# Patient Record
Sex: FEMALE | Race: WHITE | NOT HISPANIC OR LATINO | Employment: PART TIME | URBAN - METROPOLITAN AREA
[De-identification: names, ages, dates, MRNs, and addresses within clinical notes are randomized per-mention and may not be internally consistent; named-entity substitution may affect disease eponyms.]

---

## 2023-09-13 LAB
ANION GAP IN SER/PLAS: 13 MMOL/L (ref 10–20)
CALCIUM (MG/DL) IN SER/PLAS: 9.7 MG/DL (ref 8.6–10.6)
CARBON DIOXIDE, TOTAL (MMOL/L) IN SER/PLAS: 26 MMOL/L (ref 21–32)
CHLORIDE (MMOL/L) IN SER/PLAS: 102 MMOL/L (ref 98–107)
CREATININE (MG/DL) IN SER/PLAS: 0.69 MG/DL (ref 0.5–1.05)
GFR FEMALE: >90 ML/MIN/1.73M2
GLUCOSE (MG/DL) IN SER/PLAS: 134 MG/DL (ref 74–99)
POTASSIUM (MMOL/L) IN SER/PLAS: 3.8 MMOL/L (ref 3.5–5.3)
SODIUM (MMOL/L) IN SER/PLAS: 137 MMOL/L (ref 136–145)
UREA NITROGEN (MG/DL) IN SER/PLAS: 20 MG/DL (ref 6–23)

## 2023-10-10 ENCOUNTER — APPOINTMENT (OUTPATIENT)
Dept: RADIOLOGY | Facility: CLINIC | Age: 73
End: 2023-10-10
Payer: MEDICARE

## 2023-11-14 ENCOUNTER — HOSPITAL ENCOUNTER (OUTPATIENT)
Dept: RADIOLOGY | Facility: CLINIC | Age: 73
Discharge: HOME | End: 2023-11-14
Payer: MEDICARE

## 2023-11-14 DIAGNOSIS — K86.2 PANCREATIC CYST (HHS-HCC): ICD-10-CM

## 2023-11-14 DIAGNOSIS — K76.89 OTHER SPECIFIED DISEASES OF LIVER: ICD-10-CM

## 2023-11-14 DIAGNOSIS — Q45.3 OTHER CONGENITAL MALFORMATIONS OF PANCREAS AND PANCREATIC DUCT: ICD-10-CM

## 2023-11-14 PROCEDURE — 74183 MRI ABD W/O CNTR FLWD CNTR: CPT | Performed by: RADIOLOGY

## 2023-11-14 PROCEDURE — 76376 3D RENDER W/INTRP POSTPROCES: CPT | Performed by: RADIOLOGY

## 2023-11-14 PROCEDURE — 74183 MRI ABD W/O CNTR FLWD CNTR: CPT

## 2023-11-14 PROCEDURE — 2550000001 HC RX 255 CONTRASTS: Performed by: SURGERY

## 2023-11-14 PROCEDURE — A9575 INJ GADOTERATE MEGLUMI 0.1ML: HCPCS | Performed by: SURGERY

## 2023-11-14 RX ORDER — GADOTERATE MEGLUMINE 376.9 MG/ML
11 INJECTION INTRAVENOUS
Status: COMPLETED | OUTPATIENT
Start: 2023-11-14 | End: 2023-11-14

## 2023-11-14 RX ADMIN — GADOTERATE MEGLUMINE 11 ML: 376.9 INJECTION INTRAVENOUS at 09:19

## 2023-12-19 PROBLEM — K76.89 LIVER CYST: Status: ACTIVE | Noted: 2023-12-19

## 2023-12-19 PROBLEM — K86.2 PANCREATIC CYST (HHS-HCC): Status: ACTIVE | Noted: 2023-12-19

## 2023-12-19 NOTE — PROGRESS NOTES
Reason for visit:  FUV / Review MRI    HPI:  I saw this pt in July and Oct 2022-Oct note  This patient has had her surveillance MRI done. To me the liver cyst and pancreatic duct dilatation looks stable. The final read is not yet back. I await the final read and our upcoming conference review to provide final recommendations. I suspect that if my read of the MRI is correct that we will simply plan surveillance imaging. I will of course update Dr. Bacon in transplant and hepatobiliary surgery as she is the one following the liver cyst. I will wait until I get the final read back.     Either myself or my nurse partner will call this patient after our conference review next week.     This note has been dictated with voice recognition software and has not been reviewed for grammar or content errors.     Reviewed at conf today 10/12: Liver cyst stable to us....Dr Bacon managing. PD dil rock stable with no mass or new finding. Our group felt that MRI in a year approp for PD dil. Do not feel endo eval needed. I will email Dr Bacon. Nurse partner to call pt.     --------    MRI Nov 14, 2023:  IMPRESSION:  1. There is a cystic lesion of hepatic segment 8 with thin peripheral  nonenhancing septa which is unchanged in size when compared to the  prior MRI dated 10/04/2022 and may represent a biliary cystadenoma.  2. Diffuse dilatation of the pancreatic duct to the level of the  ampulla without evidence of an obstructing mass or stone unchanged  since 10/04/2022. 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    -------    This patient reports no new health issues over the course of the last year.  She has occasional arthritic neck pain.  We reviewed her MRI at our weekly conference this morning and feel that both the liver and pancreas are completely stable to last year.  Again she has mild pancreatic ductal dilatation up to about 5 mm with no solid nodular  enhancing component or no mass.  Her liver lesion is stable just under 5 cm in size and could be a simple cyst or potentially a cystadenoma.  Our liver surgeons were on the call and felt given that we have stability over a year plus and given the inability to clearly distinguish a simple liver cyst from a biliary cystadenoma that it is completely reasonable to keep an eye on it with her planned surveillance imaging.    Impression / Plan:    This patient has a completely stable MRI this year compared to last year.  She has stable mild pancreatic ductal dilatation to 5 mm with no solid nodular enhancing component or obstructing mass.  She has a stable just under 5 cm liver cyst.  As noted above our group thinks it is reasonable and safe to continue with ongoing annual surveillance and that is what this patient wishes to do.    She will call the office in a years time and my nurse partner will set up the MRI and office visit.    This note has been dictated with voice recognition software and has not been reviewed for grammar or content errors.

## 2023-12-20 ENCOUNTER — OFFICE VISIT (OUTPATIENT)
Dept: SURGICAL ONCOLOGY | Facility: CLINIC | Age: 73
End: 2023-12-20
Payer: MEDICARE

## 2023-12-20 VITALS
DIASTOLIC BLOOD PRESSURE: 80 MMHG | SYSTOLIC BLOOD PRESSURE: 144 MMHG | TEMPERATURE: 98.1 F | RESPIRATION RATE: 12 BRPM | HEART RATE: 85 BPM | BODY MASS INDEX: 19.05 KG/M2 | HEIGHT: 67 IN | WEIGHT: 121.36 LBS | OXYGEN SATURATION: 99 %

## 2023-12-20 DIAGNOSIS — K86.2 PANCREATIC CYST (HHS-HCC): Primary | ICD-10-CM

## 2023-12-20 DIAGNOSIS — K76.89 LIVER CYST: ICD-10-CM

## 2023-12-20 PROBLEM — Q45.3 PANCREATIC DUCTAL ABNORMALITY: Status: ACTIVE | Noted: 2023-12-20

## 2023-12-20 PROCEDURE — 1126F AMNT PAIN NOTED NONE PRSNT: CPT | Performed by: SURGERY

## 2023-12-20 PROCEDURE — 99212 OFFICE O/P EST SF 10 MIN: CPT | Performed by: SURGERY

## 2023-12-20 PROCEDURE — 1159F MED LIST DOCD IN RCRD: CPT | Performed by: SURGERY

## 2023-12-20 RX ORDER — LISINOPRIL AND HYDROCHLOROTHIAZIDE 10; 12.5 MG/1; MG/1
1 TABLET ORAL DAILY
COMMUNITY
Start: 2019-05-22

## 2023-12-20 RX ORDER — CHOLECALCIFEROL (VITAMIN D3) 50 MCG
TABLET ORAL EVERY 24 HOURS
COMMUNITY

## 2023-12-20 RX ORDER — HYDROCODONE/ACETAMINOPHEN 5 MG-500MG
TABLET ORAL
COMMUNITY
End: 2023-12-20 | Stop reason: SDUPTHER

## 2023-12-20 RX ORDER — ACETAMINOPHEN 500 MG
TABLET ORAL
COMMUNITY
Start: 2020-06-01 | End: 2023-12-20 | Stop reason: SDUPTHER

## 2023-12-20 RX ORDER — METHOCARBAMOL 750 MG/1
TABLET, FILM COATED ORAL
COMMUNITY
Start: 2019-08-21 | End: 2023-12-20 | Stop reason: ALTCHOICE

## 2023-12-20 RX ORDER — MELOXICAM 15 MG/1
TABLET ORAL
COMMUNITY
Start: 2019-08-28 | End: 2023-12-20 | Stop reason: ALTCHOICE

## 2023-12-20 RX ORDER — SIMVASTATIN 40 MG/1
TABLET, FILM COATED ORAL
COMMUNITY
Start: 2019-05-22

## 2023-12-20 RX ORDER — ASPIRIN 81 MG/1
81 TABLET ORAL DAILY
COMMUNITY
End: 2023-12-20 | Stop reason: SDUPTHER

## 2023-12-20 RX ORDER — TRAMADOL HYDROCHLORIDE 50 MG/1
50 TABLET ORAL EVERY 6 HOURS PRN
COMMUNITY
End: 2023-12-20 | Stop reason: ALTCHOICE

## 2023-12-20 RX ORDER — ACETAMINOPHEN 500 MG
750 TABLET ORAL
COMMUNITY

## 2023-12-20 RX ORDER — ASPIRIN 81 MG/1
TABLET ORAL
COMMUNITY
Start: 2020-06-01 | End: 2023-12-20 | Stop reason: SDUPTHER

## 2023-12-20 RX ORDER — LUTEIN 90 %
POWDER (GRAM) MISCELLANEOUS
COMMUNITY
End: 2023-12-20 | Stop reason: SDUPTHER

## 2023-12-20 RX ORDER — MELOXICAM 7.5 MG/1
1 TABLET ORAL DAILY
COMMUNITY

## 2023-12-20 RX ORDER — AMOXICILLIN 500 MG/1
CAPSULE ORAL
COMMUNITY
Start: 2023-12-11

## 2023-12-20 RX ORDER — EZETIMIBE 10 MG/1
1 TABLET ORAL DAILY
COMMUNITY
Start: 2019-05-22

## 2023-12-20 RX ORDER — HYDROCODONE BITARTRATE AND ACETAMINOPHEN 5; 325 MG/1; MG/1
TABLET ORAL
COMMUNITY
Start: 2020-06-01

## 2023-12-20 RX ORDER — SPIRONOLACTONE 25 MG
TABLET ORAL EVERY 24 HOURS
COMMUNITY

## 2023-12-20 RX ORDER — METHOCARBAMOL 500 MG/1
TABLET, FILM COATED ORAL
COMMUNITY
End: 2023-12-20 | Stop reason: ALTCHOICE

## 2023-12-20 RX ORDER — LUTEIN 6 MG
TABLET ORAL
COMMUNITY
End: 2023-12-20 | Stop reason: SDUPTHER

## 2023-12-20 RX ORDER — GABAPENTIN 300 MG/1
CAPSULE ORAL EVERY 12 HOURS
COMMUNITY
Start: 2019-08-26

## 2023-12-20 ASSESSMENT — LIFESTYLE VARIABLES
HOW MANY STANDARD DRINKS CONTAINING ALCOHOL DO YOU HAVE ON A TYPICAL DAY: 1 OR 2
HOW OFTEN DO YOU HAVE SIX OR MORE DRINKS ON ONE OCCASION: NEVER
AUDIT-C TOTAL SCORE: 1
SKIP TO QUESTIONS 9-10: 1
HOW OFTEN DO YOU HAVE A DRINK CONTAINING ALCOHOL: MONTHLY OR LESS

## 2023-12-20 ASSESSMENT — PATIENT HEALTH QUESTIONNAIRE - PHQ9
SUM OF ALL RESPONSES TO PHQ9 QUESTIONS 1 & 2: 0
1. LITTLE INTEREST OR PLEASURE IN DOING THINGS: NOT AT ALL
2. FEELING DOWN, DEPRESSED OR HOPELESS: NOT AT ALL

## 2023-12-20 ASSESSMENT — ENCOUNTER SYMPTOMS
DEPRESSION: 0
OCCASIONAL FEELINGS OF UNSTEADINESS: 0

## 2023-12-20 ASSESSMENT — PAIN SCALES - GENERAL: PAINLEVEL: 0-NO PAIN

## 2024-08-27 DIAGNOSIS — K86.2 PANCREATIC CYST (HHS-HCC): ICD-10-CM

## 2024-08-27 DIAGNOSIS — K76.89 LIVER CYST: ICD-10-CM

## 2024-11-01 ENCOUNTER — HOSPITAL ENCOUNTER (OUTPATIENT)
Dept: RADIOLOGY | Facility: CLINIC | Age: 74
Discharge: HOME | End: 2024-11-01
Payer: MEDICARE

## 2024-11-01 DIAGNOSIS — K86.2 PANCREATIC CYST (HHS-HCC): ICD-10-CM

## 2024-11-01 DIAGNOSIS — K76.89 LIVER CYST: ICD-10-CM

## 2024-11-01 PROCEDURE — 2550000001 HC RX 255 CONTRASTS: Performed by: SURGERY

## 2024-11-01 PROCEDURE — 74183 MRI ABD W/O CNTR FLWD CNTR: CPT

## 2024-11-01 PROCEDURE — A9575 INJ GADOTERATE MEGLUMI 0.1ML: HCPCS | Performed by: SURGERY

## 2024-11-01 RX ORDER — GADOTERATE MEGLUMINE 376.9 MG/ML
11 INJECTION INTRAVENOUS
Status: COMPLETED | OUTPATIENT
Start: 2024-11-01 | End: 2024-11-01

## 2024-11-04 NOTE — PROGRESS NOTES
"Reason for visit:  FUV / Review Imaging    HPI:  Recall I met this pt in 2022 for liver cyst and mild PD dil.  Last seen December 2023.  Summary from note then:    \"This patient has a completely stable MRI this year compared to last year.  She has stable mild pancreatic ductal dilatation to 5 mm with no solid nodular enhancing component or obstructing mass.  She has a stable just under 5 cm liver cyst.  As noted above our group thinks it is reasonable and safe to continue with ongoing annual surveillance and that is what this patient wishes to do.     She will call the office in a years time and my nurse partner will set up the MRI and office visit.\"    ------    MRI Nov 2024:  LIVER:  The liver measures 14.5 cm in the craniocaudal dimension. The liver  is isointense on in and opposed phase imaging. Multiple small T2  hyperintense lesions are noted within the liver without postcontrast  enhancement as annotated on series 5, measuring up to 1.2 cm. There  is again a larger 5.3 x 4.2 x 3.2 cm multilobulated T2 hyperintense,  T1 hypointense lesion within segment VIII with enhancement of  multiple thin septations, unchanged on remeasurement on analogous  slice. No evident focal solid component.  PANCREAS:  There is dilation of the main pancreatic duct measuring up to 0.7 cm  with relatively homogeneous dilation. No pancreatic atrophy. Stable  appearance of few sub 5 mm T2 hyperintense lesions within the  pancreatic body/tail, with connection with the main pancreatic duct  is not definitively visualized.  IMPRESSION:  1. Unchanged 5 cm multilobulated cystic lesion within segment VIII of  the liver.  2. Similar appearance of pancreatic ductal dilation with  subcentimeter cystic lesions within the pancreas.    This patient reports no new health issues since her visit with me last year.  She is feeling well and continues to work.  No abdominal pain or back pain.    Impression / Plan:     To my eye this patient has stable " "imaging in terms of a simple liver cyst.  She also has very mild pancreatic ductal dilatation that does not look to me like IPMN.  The radiologist to read this year's imaging describes a stable picture but the degree of pancreatic ductal dilatation seems to be a bit different.  I will review this patient's imaging with one of my colleagues just to make sure that I am not missing something but I anticipate that we will simply be continuing with MRI imaging on an annual basis.    Update 11/6:  Reviewed at conference today.  Stable to us at imaging review with PD 6-7mm.   This does not \"look like\"  IPMN PD dil as the CBD is mildly dilated with GB in.  Stable since 2022.  Discuss option of endo eval vs planned MRI surveillance, and we favor the former as planned.  "

## 2024-11-05 ENCOUNTER — APPOINTMENT (OUTPATIENT)
Dept: SURGERY | Facility: CLINIC | Age: 74
End: 2024-11-05
Payer: MEDICARE

## 2024-11-05 VITALS
HEIGHT: 67 IN | DIASTOLIC BLOOD PRESSURE: 81 MMHG | TEMPERATURE: 97.1 F | SYSTOLIC BLOOD PRESSURE: 132 MMHG | OXYGEN SATURATION: 96 % | HEART RATE: 77 BPM | WEIGHT: 119.2 LBS | BODY MASS INDEX: 18.71 KG/M2

## 2024-11-05 DIAGNOSIS — K86.2 PANCREATIC CYST (HHS-HCC): Primary | ICD-10-CM

## 2024-11-05 DIAGNOSIS — K76.89 LIVER CYST: ICD-10-CM

## 2024-11-05 PROCEDURE — 3008F BODY MASS INDEX DOCD: CPT | Performed by: SURGERY

## 2024-11-05 PROCEDURE — 1159F MED LIST DOCD IN RCRD: CPT | Performed by: SURGERY

## 2024-11-05 PROCEDURE — 99212 OFFICE O/P EST SF 10 MIN: CPT | Performed by: SURGERY

## 2024-11-06 ENCOUNTER — TELEPHONE (OUTPATIENT)
Dept: SURGICAL ONCOLOGY | Facility: CLINIC | Age: 74
End: 2024-11-06
Payer: MEDICARE

## 2024-11-06 NOTE — TELEPHONE ENCOUNTER
I had called and left a VM and the pt returned my call and I spoke to her. She is aware that Dr. Kellogg reviewed her images at the pancreas conference today. They agreed the cyst looks stable as discussed in clinic. We are recommending another MRI in 1 year. She will contact the office and we will obtain the scan and office visit . She voiced understanding.

## 2024-11-08 ENCOUNTER — APPOINTMENT (OUTPATIENT)
Dept: SURGERY | Facility: CLINIC | Age: 74
End: 2024-11-08
Payer: MEDICARE

## 2025-09-04 ENCOUNTER — TELEPHONE (OUTPATIENT)
Dept: SURGICAL ONCOLOGY | Facility: CLINIC | Age: 75
End: 2025-09-04
Payer: MEDICARE